# Patient Record
Sex: MALE | Race: AMERICAN INDIAN OR ALASKA NATIVE
[De-identification: names, ages, dates, MRNs, and addresses within clinical notes are randomized per-mention and may not be internally consistent; named-entity substitution may affect disease eponyms.]

---

## 2019-02-24 ENCOUNTER — HOSPITAL ENCOUNTER (EMERGENCY)
Dept: HOSPITAL 5 - ED | Age: 24
Discharge: HOME | End: 2019-02-24
Payer: COMMERCIAL

## 2019-02-24 VITALS — DIASTOLIC BLOOD PRESSURE: 80 MMHG | SYSTOLIC BLOOD PRESSURE: 126 MMHG
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
## 2019-02-24 DIAGNOSIS — J45.909: ICD-10-CM

## 2019-02-24 DIAGNOSIS — M54.5: ICD-10-CM

## 2019-02-24 DIAGNOSIS — F17.200: ICD-10-CM

## 2019-02-24 DIAGNOSIS — Z88.6: ICD-10-CM
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
## 2019-02-24 DIAGNOSIS — S50.12XA: ICD-10-CM

## 2019-02-24 DIAGNOSIS — M54.6: ICD-10-CM
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
## 2019-02-24 DIAGNOSIS — S16.1XXA: Primary | ICD-10-CM

## 2019-02-24 PROCEDURE — 72100 X-RAY EXAM L-S SPINE 2/3 VWS: CPT

## 2019-02-24 PROCEDURE — 72072 X-RAY EXAM THORAC SPINE 3VWS: CPT

## 2019-02-24 PROCEDURE — 72040 X-RAY EXAM NECK SPINE 2-3 VW: CPT
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
